# Patient Record
Sex: FEMALE | Race: WHITE | NOT HISPANIC OR LATINO | URBAN - METROPOLITAN AREA
[De-identification: names, ages, dates, MRNs, and addresses within clinical notes are randomized per-mention and may not be internally consistent; named-entity substitution may affect disease eponyms.]

---
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
## 2024-05-29 NOTE — ED PROVIDER NOTE - PHYSICAL EXAMINATION
General: awake, alert, interactive, no acute distress  Head: NCAT  ENT:  PERRLA, non erythematous pharynx, no exudates. TM's non bulging, non erythematous  RESP: CTABL,   CVS: s1, s2, no murmur  PULSES: 2+   ABDO: soft, + periumbilical tenderness, no masses  MSK: full ROM, no swelling or erythema  NEURO: alert and oriented  SKIN: no rashes normal...

## 2024-06-08 NOTE — ED PROVIDER NOTE - NSTIMEPROVIDERCAREINITIATE_GEN_ER
04-Feb-2020 18:58 Show Text Field For Brand Names Of Contraception?: Yes Myalgia Monitoring: I explained this is common when taking isotretinoin. If this worsens they will contact us. Dosing Month 1 (Required For Cumulative Dosing): 40mg Daily Hypertriglyceridemia Monitoring: I explained this is common when taking isotretinoin. We will monitor closely. Cheilitis Normal Treatment: I recommended application of Vaseline or Aquaphor numerous times a day (as often as every hour) and before going to bed. Myalgia Treatment: I explained this is common when taking isotretinoin. If this worsens they will contact us. They may try OTC ibuprofen. Nosebleeds Normal Treatment: I explained this is common when taking isotretinoin. I recommended saline mist in each nostril multiple times a day. If this worsens they will contact us. Cheilitis Aggressive Treatment: I recommended application of Vaseline or Aquaphor numerous times a day (as often as every hour) and before going to bed. I also prescribed a topical steroid for twice daily use. Counseling Text: I reviewed the side effect in detail. Patient should get monthly blood tests, not donate blood, not drive at night if vision affected, and not share medication. Xerosis Normal Treatment: I recommended application of Cetaphil or CeraVe numerous times a day going to bed to all dry areas. Retinoid Dermatitis Normal Treatment: I recommended more frequent application of Cetaphil or CeraVe to the areas of dermatitis. Detail Level: Zone Headache Monitoring: I recommended monitoring the headaches for now. There is no evidence of increased intracranial pressure. They were instructed to call if the headaches are worsening. What Is The Patient's Gender: Male Xerosis Aggressive Treatment: I recommended application of Cetaphil or CeraVe numerous times a day going to bed to all dry areas. I also prescribed a topical steroid for twice daily use. Retinoid Dermatitis Aggressive Treatment: I recommended more frequent application of Cetaphil or CeraVe to the areas of dermatitis. I also prescribed a topical steroid for twice daily use until the dermatitis resolves. Female Pregnancy Counseling Text: Female patients should also be on two forms of birth control while taking this medication and for one month after their last dose. Calculate Months Of Therapy Based On Documented Dosages (Will Hide Months Of Therapy Question)?: No Use Therapeutic Ranged Or Therapeutic Target: Range Pounds Preamble Statement (Weight Entered In Details Tab): Reported Weight in pounds: Kilograms Preamble Statement (Weight Entered In Details Tab): Reported Weight in kilograms: Ipledge Number (Optional): [id number] Xerosis Normal Treatment: I recommended application of Cetaphil or CeraVe numerous times a day and before going to bed to all dry areas. Female Completion Statement: After discussing her treatment course we decided to discontinue isotretinoin therapy at this time. I explained that she would need to continue her birth control methods for at least one month after the last dosage. She should also get a pregnancy test one month after the last dose. She shouldn't donate blood for one month after the last dose. She should call with any new symptoms of depression. Patient Weight (Optional But Required For Cumulative Dose-Numbers And Decimals Only): 140 Lower Range (In Mg/Kg): 120 Weight Units: pounds Next Month's Dosage: 30mg BID Upper Range (In Mg/Kg): 150 Xerosis Aggressive Treatment: I recommended application of Cetaphil or CeraVe numerous times a day and before going to bed to all dry areas. I also prescribed a topical steroid for twice daily use. Male Completion Statement: After discussing his treatment course we decided to discontinue isotretinoin therapy at this time. He shouldn't donate blood for one month after the last dose. He should call with any new symptoms of depression. Months Of Therapy Completed: 2 Target Cumulative Dosage (In Mg/Kg): 135